# Patient Record
Sex: FEMALE | Race: WHITE | Employment: OTHER | URBAN - METROPOLITAN AREA
[De-identification: names, ages, dates, MRNs, and addresses within clinical notes are randomized per-mention and may not be internally consistent; named-entity substitution may affect disease eponyms.]

---

## 2022-10-25 ENCOUNTER — TELEPHONE (OUTPATIENT)
Dept: PULMONOLOGY | Age: 82
End: 2022-10-25

## 2022-10-25 NOTE — TELEPHONE ENCOUNTER
Patient did not show for NP  with Dr. Saul Camacho on 10/25/22. Reason:  na    This is patient's first no show. Patient was ano show on: na.      Patient did not reschedule.   Reschedule date:  na

## 2023-02-07 ENCOUNTER — OFFICE VISIT (OUTPATIENT)
Dept: PULMONOLOGY | Age: 83
End: 2023-02-07
Payer: MEDICARE

## 2023-02-07 VITALS
BODY MASS INDEX: 37.86 KG/M2 | HEART RATE: 76 BPM | SYSTOLIC BLOOD PRESSURE: 116 MMHG | DIASTOLIC BLOOD PRESSURE: 56 MMHG | RESPIRATION RATE: 16 BRPM | TEMPERATURE: 98 F | OXYGEN SATURATION: 96 % | HEIGHT: 62 IN

## 2023-02-07 DIAGNOSIS — E66.09 CLASS 2 OBESITY DUE TO EXCESS CALORIES WITH BODY MASS INDEX (BMI) OF 37.0 TO 37.9 IN ADULT, UNSPECIFIED WHETHER SERIOUS COMORBIDITY PRESENT: ICD-10-CM

## 2023-02-07 DIAGNOSIS — J96.11 CHRONIC RESPIRATORY FAILURE WITH HYPOXIA (HCC): ICD-10-CM

## 2023-02-07 DIAGNOSIS — Z01.811 ENCOUNTER FOR PRE-OPERATIVE RESPIRATORY CLEARANCE: ICD-10-CM

## 2023-02-07 PROCEDURE — 99202 OFFICE O/P NEW SF 15 MIN: CPT | Performed by: INTERNAL MEDICINE

## 2023-02-07 PROCEDURE — G8427 DOCREV CUR MEDS BY ELIG CLIN: HCPCS | Performed by: INTERNAL MEDICINE

## 2023-02-07 PROCEDURE — G8419 CALC BMI OUT NRM PARAM NOF/U: HCPCS | Performed by: INTERNAL MEDICINE

## 2023-02-07 PROCEDURE — 1036F TOBACCO NON-USER: CPT | Performed by: INTERNAL MEDICINE

## 2023-02-07 PROCEDURE — 1123F ACP DISCUSS/DSCN MKR DOCD: CPT | Performed by: INTERNAL MEDICINE

## 2023-02-07 PROCEDURE — 1090F PRES/ABSN URINE INCON ASSESS: CPT | Performed by: INTERNAL MEDICINE

## 2023-02-07 PROCEDURE — G8484 FLU IMMUNIZE NO ADMIN: HCPCS | Performed by: INTERNAL MEDICINE

## 2023-02-07 PROCEDURE — G8400 PT W/DXA NO RESULTS DOC: HCPCS | Performed by: INTERNAL MEDICINE

## 2023-02-07 ASSESSMENT — SLEEP AND FATIGUE QUESTIONNAIRES
HOW LIKELY ARE YOU TO NOD OFF OR FALL ASLEEP IN A CAR, WHILE STOPPED FOR A FEW MINUTES IN TRAFFIC: 0
HOW LIKELY ARE YOU TO NOD OFF OR FALL ASLEEP WHILE SITTING AND TALKING TO SOMEONE: 1
HOW LIKELY ARE YOU TO NOD OFF OR FALL ASLEEP WHILE SITTING AND READING: 0
ESS TOTAL SCORE: 5
HOW LIKELY ARE YOU TO NOD OFF OR FALL ASLEEP WHEN YOU ARE A PASSENGER IN A CAR FOR AN HOUR WITHOUT A BREAK: 0
HOW LIKELY ARE YOU TO NOD OFF OR FALL ASLEEP WHILE WATCHING TV: 0
HOW LIKELY ARE YOU TO NOD OFF OR FALL ASLEEP WHILE SITTING QUIETLY AFTER LUNCH WITHOUT ALCOHOL: 1
HOW LIKELY ARE YOU TO NOD OFF OR FALL ASLEEP WHILE SITTING INACTIVE IN A PUBLIC PLACE: 1
HOW LIKELY ARE YOU TO NOD OFF OR FALL ASLEEP WHILE LYING DOWN TO REST IN THE AFTERNOON WHEN CIRCUMSTANCES PERMIT: 2

## 2023-02-07 NOTE — PATIENT INSTRUCTIONS
Remember to bring a list of pulmonary medications and any CPAP or BiPAP machines to your next appointment with the office. Please keep all of your future appointments scheduled by [address], [facility] office. Out of respect for other patients and providers, you may be asked to reschedule your appointment if you arrive later than your scheduled appointment time. Appointments cancelled less than 24hrs in advance will be considered a no show. Patients with three missed appointments within 1 year or four missed appointments within 2 years can be dismissed from the practice. Please be aware that our physicians are required to work in the Intensive Care Unit at [facility].  Your appointment may need to be rescheduled if they are designated to work during your appointment time. You may receive a survey regarding the care you received during your visit. Your input is valuable to us. We encourage you to complete and return your survey. We hope you will choose us in the future for your healthcare needs. Pt instructed of all future appointment dates & times, including radiology, labs, procedures & referrals. If procedures were scheduled preparation instructions provided. Instructions on future appointments with [facility] were given.

## 2023-02-07 NOTE — LETTER
[facility] Critical Care and Sleep  [address]  [address]  [place] [address]  Phone: [phone]  Fax: [phone]           ERICA PARSONS MD      February 8, 2023     Patient: Rosibel De La Rosa   MR Number: [id number]   YOB: 1940   Date of Visit: 2/7/2023       Dear Dr. Whitlock:    Thank you for referring Rosibel De La Rosa to me for evaluation/treatment. Below are the relevant portions of my assessment and plan of care.          If you have questions, please do not hesitate to call me. I look forward to following Rosibel along with you.    Sincerely,        ERICA PARSONS MD    CC providers:  Noah Whitlock MD  [address] [place] [id number]  Via Mail

## 2023-02-07 NOTE — LETTER
[facility] Pul[facility] Critical Care and Sleep  [address] [place] [address]  Phone: [phone]  Fax: [phone]    Olive Watkins MD    February 8, 2023     Aba Charlton MD  [address] [place] [id number]    Patient: Claudine Escobar   MR Number: [id number]   YOB: 1940   Date of Visit: 2/7/2023       Dear Aba Charlton: Thank you for referring Claudine Escobar to me for evaluation/treatment. Below are the relevant portions of my assessment and plan of care. If you have questions, please do not hesitate to call me. I look forward to following Frank Mayer along with you.     Sincerely,      Olive Watkins MD

## 2023-02-07 NOTE — LETTER
[facility] Pulmonary Critical Care and Sleep  [address] [address]  Phone: [phone]  Fax: [phone]    Kassie Wong MD    February 8, 2023     Thor Pires MD  [address] [place] [id number]    Patient: Wisam Christie   MR Number: [id number]   YOB: 1940   Date of Visit: 2/7/2023       Dear Thor Pires: Thank you for referring Wisam Christie to me for evaluation/treatment. Below are the relevant portions of my assessment and plan of care. If you have questions, please do not hesitate to call me. I look forward to following Min Michaud along with you.     Sincerely,      Kassie Wong MD

## 2023-02-08 PROBLEM — J96.11 CHRONIC RESPIRATORY FAILURE WITH HYPOXIA (HCC): Status: ACTIVE | Noted: 2023-02-08

## 2023-02-09 NOTE — PROGRESS NOTES
Chief Complaint/Referring Provider:  Patient is being seen at the request of Dr. Sonya Cheng for a consultation for preop clearance      Presenting HPI: Patient is a 79-year-old female who has been referred to the office for pulm evaluation for preop clearance for EGD  Patient states that she fell down on her trunk about few years back and ever since patient is in nursing home, patient states that she has been on oxygen in the nursing home ever since, patient does not know why she is on the oxygen, patient states that she does not have any significant cough or expectoration at this time, patient does not have any shortness of breath or wheezing, patient does not have any chest pain or palpitations, patient states that she cannot walk for last 2 years time because of her back issues including sciatica, patient does not know whether she snores or not, patient states that she feels tired sometimes in the daytime but does not give any discrete history of sleep fragmentation or sleepiness on the time, patient states that she does not have any chest pain or palpitations, patient does not have any abdominal symptoms of concern, patient does not give history of any bright red blood per rectum, patient states that she does not know about any procedures, patient sometimes has leg edema, patient also states that she probably has high blood sugars at times, patient states that she used to smoke but she quit many decades back, patient is not giving any other pertinent review of system of concern    Past Medical History:   Diagnosis Date    Cystocele 07/10/2008    Urinary incontinence     Vulvitis 07/10/2008       Past Surgical History:   Procedure Laterality Date    HYSTERECTOMY (CERVIX STATUS UNKNOWN)      KNEE SURGERY  2009       Allergies   Allergen Reactions    Asa [Aspirin] Other (See Comments)     bleeding    Darvocet [Propoxyphene N-Acetaminophen] Itching       Medication list was reviewed and updated as needed in Epic    Social History     Socioeconomic History    Marital status:      Spouse name: Not on file    Number of children: Not on file    Years of education: Not on file    Highest education level: Not on file   Occupational History    Not on file   Tobacco Use    Smoking status: Never    Smokeless tobacco: Not on file   Substance and Sexual Activity    Alcohol use: Not on file    Drug use: Not on file    Sexual activity: Not on file   Other Topics Concern    Not on file   Social History Narrative    Not on file     Social Determinants of Health     Financial Resource Strain: Not on file   Food Insecurity: Not on file   Transportation Needs: Not on file   Physical Activity: Not on file   Stress: Not on file   Social Connections: Not on file   Intimate Partner Violence: Not on file   Housing Stability: Not on file       History reviewed. No pertinent family history. Review of Systems same as above    Physical Exam:  Blood pressure (!) 116/56, pulse 76, temperature 98 °F (36.7 °C), temperature source Temporal, resp. rate 16, height 5' 2\" (1.575 m), SpO2 96 %.'  Constitutional:  No acute distress. HENT:  Oropharynx is clear and moist. No thyromegaly. Mallampati 3  Eyes:  Conjunctivae arenormal. Pupils equal, round, and reactive to light. No scleral icterus. Neck: . No tracheal deviation present. No obvious thyroid mass. Short and large neck  Cardiovascular:Normal rate, regular rhythm, normal heart sounds. No right ventricular heave. Some lower extremity edema. Pulmonary/Chest: No wheezes. No rales. Chest wall is not dull to percussion. Noaccessory muscle usage or stridor. Decreased breath sound intensity at the bases  Abdominal: Soft. Bowel sounds present. No distension or hernia. Notenderness. Musculoskeletal: No cyanosis. No clubbing. No obvious joint deformity. Lymphadenopathy: No cervical or supraclavicular adenopathy. Skin: Skin is warm and dry.  No rash or nodules on the exposed extremities. Psychiatric: Normal mood and affect. Behavior is normal.  No anxiety. Neurologic: Alert, awake and oriented. PERRL. Speech fluent      Sleep Medicine Data:  Sitting and reading: Would never doze  Watching TV: Would never doze  Sitting, inactive in a public place (e.g. a theatre or a meeting): Slight chance of dozing  As a passenger in a car for an hour without a break: Would never doze  Lying down to rest in the afternoon when circumstances permit: Moderate chance of dozing  Sitting and talking to someone: Slight chance of dozing  Sitting quietly after a lunch without alcohol: Slight chance of dozing  In a car, while stopped for a few minutes in traffic: Would never doze  [place] Sleepiness Score: 5       Data:     Imaging:  I have reviewed radiology images personally. No orders to display     No results found. Assessment:    1. Encounter for pre-operative respiratory clearance      2. Class 2 obesity due to excess calories with body mass index (BMI) of 37.0 to 37.9 in adult, unspecified whether serious comorbidity present      3.  Chronic respiratory failure with hypoxia (HCC)          Plan:   Patient's review of system were discussed  Patient was told about the clinical findings including auscultation and implications  Patient does not have any adventitious sounds on auscultation at this time  No x-ray chest or PFT available for review-no clinical data to suggest that patient needs to have them at this time  Patient does have some class II obesity but does not give any discrete history of sleep fragmentation-will hold off on any sleep studies  Patient does have chronic respite failure with hypoxemia and patient does not know the reason for that  Please titrate oxygen to keep saturation between 90 to 94% only  Patient does not need any antibiotics steroids or inhalers from pulm standpointofview  Patient does not have any gross contraindication for upper GI endoscopy from pulmonary standpoint of view  Patient does have morbid obesity and there is a possibility of patient having hypoventilation and hypercarbia with anesthesia and patient needs to monitor for any increased somnolence after the procedure  Diuretics as given by patient's PCP

## 2023-02-10 NOTE — PROGRESS NOTES
MA Communication:   The following orders are received by verbal communication from Ryan Thomas MD    Orders include:    F/U PRN    Clearance paper faxed

## 2023-03-09 PROBLEM — Z01.811 ENCOUNTER FOR PRE-OPERATIVE RESPIRATORY CLEARANCE: Status: RESOLVED | Noted: 2023-02-07 | Resolved: 2023-03-09

## 2023-03-13 ENCOUNTER — ANESTHESIA (OUTPATIENT)
Dept: ENDOSCOPY | Age: 83
End: 2023-03-13
Payer: MEDICARE

## 2023-03-13 ENCOUNTER — ANESTHESIA EVENT (OUTPATIENT)
Dept: ENDOSCOPY | Age: 83
End: 2023-03-13
Payer: MEDICARE

## 2023-03-13 ENCOUNTER — HOSPITAL ENCOUNTER (OUTPATIENT)
Age: 83
Setting detail: OUTPATIENT SURGERY
Discharge: HOME | End: 2023-03-13
Attending: INTERNAL MEDICINE | Admitting: INTERNAL MEDICINE
Payer: MEDICARE

## 2023-03-13 VITALS
OXYGEN SATURATION: 100 % | HEIGHT: 62 IN | BODY MASS INDEX: 37.86 KG/M2 | DIASTOLIC BLOOD PRESSURE: 82 MMHG | RESPIRATION RATE: 16 BRPM | SYSTOLIC BLOOD PRESSURE: 112 MMHG | HEART RATE: 79 BPM | TEMPERATURE: 97 F

## 2023-03-13 DIAGNOSIS — R13.10 DYSPHAGIA, UNSPECIFIED TYPE: ICD-10-CM

## 2023-03-13 DIAGNOSIS — Z87.11 HX OF GASTRIC ULCER: ICD-10-CM

## 2023-03-13 DIAGNOSIS — D64.9 ANEMIA, UNSPECIFIED TYPE: ICD-10-CM

## 2023-03-13 DIAGNOSIS — K21.9 GASTROESOPHAGEAL REFLUX DISEASE, UNSPECIFIED WHETHER ESOPHAGITIS PRESENT: ICD-10-CM

## 2023-03-13 LAB
EKG ATRIAL RATE: 108 BPM
EKG DIAGNOSIS: NORMAL
EKG Q-T INTERVAL: 350 MS
EKG QRS DURATION: 86 MS
EKG QTC CALCULATION (BAZETT): 476 MS
EKG R AXIS: -64 DEGREES
EKG T AXIS: 81 DEGREES
EKG VENTRICULAR RATE: 111 BPM
GLUCOSE BLD-MCNC: 108 MG/DL (ref 70–99)
GLUCOSE BLD-MCNC: 89 MG/DL (ref 70–99)
PERFORMED ON: ABNORMAL
PERFORMED ON: NORMAL

## 2023-03-13 PROCEDURE — 88341 IMHCHEM/IMCYTCHM EA ADD ANTB: CPT

## 2023-03-13 PROCEDURE — 3700000000 HC ANESTHESIA ATTENDED CARE: Performed by: INTERNAL MEDICINE

## 2023-03-13 PROCEDURE — 88342 IMHCHEM/IMCYTCHM 1ST ANTB: CPT

## 2023-03-13 PROCEDURE — 7100000011 HC PHASE II RECOVERY - ADDTL 15 MIN: Performed by: INTERNAL MEDICINE

## 2023-03-13 PROCEDURE — 93005 ELECTROCARDIOGRAM TRACING: CPT | Performed by: ANESTHESIOLOGY

## 2023-03-13 PROCEDURE — 88312 SPECIAL STAINS GROUP 1: CPT

## 2023-03-13 PROCEDURE — 2500000003 HC RX 250 WO HCPCS: Performed by: NURSE ANESTHETIST, CERTIFIED REGISTERED

## 2023-03-13 PROCEDURE — 6360000002 HC RX W HCPCS: Performed by: NURSE ANESTHETIST, CERTIFIED REGISTERED

## 2023-03-13 PROCEDURE — 2580000003 HC RX 258: Performed by: FAMILY MEDICINE

## 2023-03-13 PROCEDURE — 2709999900 HC NON-CHARGEABLE SUPPLY: Performed by: INTERNAL MEDICINE

## 2023-03-13 PROCEDURE — 7100000010 HC PHASE II RECOVERY - FIRST 15 MIN: Performed by: INTERNAL MEDICINE

## 2023-03-13 PROCEDURE — 3609012400 HC EGD TRANSORAL BIOPSY SINGLE/MULTIPLE: Performed by: INTERNAL MEDICINE

## 2023-03-13 PROCEDURE — 93010 ELECTROCARDIOGRAM REPORT: CPT | Performed by: INTERNAL MEDICINE

## 2023-03-13 PROCEDURE — 88305 TISSUE EXAM BY PATHOLOGIST: CPT

## 2023-03-13 RX ORDER — OXYCODONE HYDROCHLORIDE AND ACETAMINOPHEN 5; 325 MG/1; MG/1
1 TABLET ORAL EVERY 6 HOURS PRN
COMMUNITY

## 2023-03-13 RX ORDER — ZINC SULFATE 50(220)MG
50 CAPSULE ORAL DAILY
COMMUNITY

## 2023-03-13 RX ORDER — CYCLOBENZAPRINE HCL 5 MG
5 TABLET ORAL EVERY 12 HOURS PRN
COMMUNITY

## 2023-03-13 RX ORDER — OMEPRAZOLE 20 MG/1
20 CAPSULE, DELAYED RELEASE ORAL DAILY
COMMUNITY

## 2023-03-13 RX ORDER — SENNA PLUS 8.6 MG/1
1 TABLET ORAL NIGHTLY
COMMUNITY

## 2023-03-13 RX ORDER — VITAMIN B COMPLEX
1 CAPSULE ORAL DAILY
COMMUNITY

## 2023-03-13 RX ORDER — NYSTATIN 100000 [USP'U]/G
POWDER TOPICAL PRN
COMMUNITY

## 2023-03-13 RX ORDER — SERTRALINE HYDROCHLORIDE 25 MG/1
25 TABLET, FILM COATED ORAL DAILY
COMMUNITY
Start: 2023-02-17

## 2023-03-13 RX ORDER — METHYLPREDNISOLONE 4 MG
TABLET, DOSE PACK ORAL DAILY
COMMUNITY

## 2023-03-13 RX ORDER — LEVOTHYROXINE SODIUM 0.07 MG/1
75 TABLET ORAL DAILY
COMMUNITY
Start: 2023-03-07

## 2023-03-13 RX ORDER — OXYCODONE HCL 10 MG/1
10 TABLET, FILM COATED, EXTENDED RELEASE ORAL EVERY 12 HOURS
COMMUNITY

## 2023-03-13 RX ORDER — LEVETIRACETAM 500 MG/1
500 TABLET ORAL 2 TIMES DAILY
COMMUNITY
Start: 2019-06-12

## 2023-03-13 RX ORDER — FERROUS SULFATE 325(65) MG
325 TABLET ORAL
COMMUNITY

## 2023-03-13 RX ORDER — CHOLECALCIFEROL (VITAMIN D3) 1250 MCG
CAPSULE ORAL
COMMUNITY

## 2023-03-13 RX ORDER — MEMANTINE HYDROCHLORIDE 5 MG/1
5 TABLET ORAL 2 TIMES DAILY
COMMUNITY

## 2023-03-13 RX ORDER — ASCORBIC ACID 500 MG
500 TABLET ORAL 2 TIMES DAILY
COMMUNITY

## 2023-03-13 RX ORDER — SODIUM CHLORIDE, SODIUM LACTATE, POTASSIUM CHLORIDE, CALCIUM CHLORIDE 600; 310; 30; 20 MG/100ML; MG/100ML; MG/100ML; MG/100ML
INJECTION, SOLUTION INTRAVENOUS CONTINUOUS
Status: DISCONTINUED | OUTPATIENT
Start: 2023-03-13 | End: 2023-03-13 | Stop reason: HOSPADM

## 2023-03-13 RX ORDER — CARVEDILOL 12.5 MG/1
12.5 TABLET ORAL 2 TIMES DAILY WITH MEALS
COMMUNITY

## 2023-03-13 RX ORDER — INSULIN LISPRO 100 [IU]/ML
15 INJECTION, SOLUTION INTRAVENOUS; SUBCUTANEOUS
COMMUNITY
Start: 2023-03-07

## 2023-03-13 RX ORDER — LIDOCAINE HYDROCHLORIDE 20 MG/ML
INJECTION, SOLUTION EPIDURAL; INFILTRATION; INTRACAUDAL; PERINEURAL PRN
Status: DISCONTINUED | OUTPATIENT
Start: 2023-03-13 | End: 2023-03-13 | Stop reason: SDUPTHER

## 2023-03-13 RX ORDER — GABAPENTIN 100 MG/1
100 CAPSULE ORAL DAILY
COMMUNITY
Start: 2023-02-28

## 2023-03-13 RX ORDER — POTASSIUM CHLORIDE 750 MG/1
10 TABLET, EXTENDED RELEASE ORAL 2 TIMES DAILY
COMMUNITY
Start: 2023-03-06

## 2023-03-13 RX ORDER — NITROFURANTOIN 25; 75 MG/1; MG/1
100 CAPSULE ORAL DAILY
COMMUNITY

## 2023-03-13 RX ORDER — PROPOFOL 10 MG/ML
INJECTION, EMULSION INTRAVENOUS PRN
Status: DISCONTINUED | OUTPATIENT
Start: 2023-03-13 | End: 2023-03-13 | Stop reason: SDUPTHER

## 2023-03-13 RX ORDER — MAGNESIUM OXIDE 400 MG/1
400 TABLET ORAL DAILY
COMMUNITY

## 2023-03-13 RX ORDER — TAMSULOSIN HYDROCHLORIDE 0.4 MG/1
0.4 CAPSULE ORAL DAILY
COMMUNITY

## 2023-03-13 RX ORDER — INSULIN GLARGINE 100 [IU]/ML
10 INJECTION, SOLUTION SUBCUTANEOUS NIGHTLY
COMMUNITY

## 2023-03-13 RX ORDER — LANOLIN ALCOHOL/MO/W.PET/CERES
3 CREAM (GRAM) TOPICAL DAILY
COMMUNITY

## 2023-03-13 RX ORDER — DULAGLUTIDE 0.75 MG/.5ML
0.75 INJECTION, SOLUTION SUBCUTANEOUS WEEKLY
COMMUNITY

## 2023-03-13 RX ORDER — GABAPENTIN 300 MG/1
300 CAPSULE ORAL NIGHTLY
COMMUNITY
Start: 2023-02-13

## 2023-03-13 RX ORDER — FUROSEMIDE 40 MG/1
40 TABLET ORAL DAILY
COMMUNITY

## 2023-03-13 RX ADMIN — SODIUM CHLORIDE, POTASSIUM CHLORIDE, SODIUM LACTATE AND CALCIUM CHLORIDE: 600; 310; 30; 20 INJECTION, SOLUTION INTRAVENOUS at 13:11

## 2023-03-13 RX ADMIN — PROPOFOL 30 MG: 10 INJECTION, EMULSION INTRAVENOUS at 13:21

## 2023-03-13 RX ADMIN — PROPOFOL 20 MG: 10 INJECTION, EMULSION INTRAVENOUS at 13:23

## 2023-03-13 RX ADMIN — LIDOCAINE HYDROCHLORIDE 60 MG: 20 INJECTION, SOLUTION EPIDURAL; INFILTRATION; INTRACAUDAL; PERINEURAL at 13:21

## 2023-03-13 RX ADMIN — PROPOFOL 20 MG: 10 INJECTION, EMULSION INTRAVENOUS at 13:22

## 2023-03-13 ASSESSMENT — PAIN DESCRIPTION - FREQUENCY: FREQUENCY: CONTINUOUS

## 2023-03-13 ASSESSMENT — PAIN DESCRIPTION - ONSET: ONSET: ON-GOING

## 2023-03-13 ASSESSMENT — PAIN DESCRIPTION - DESCRIPTORS
DESCRIPTORS: PATIENT UNABLE TO DESCRIBE
DESCRIPTORS: ACHING
DESCRIPTORS: PATIENT UNABLE TO DESCRIBE

## 2023-03-13 ASSESSMENT — PAIN SCALES - WONG BAKER
WONGBAKER_NUMERICALRESPONSE: 0
WONGBAKER_NUMERICALRESPONSE: 2
WONGBAKER_NUMERICALRESPONSE: 0
WONGBAKER_NUMERICALRESPONSE: 4

## 2023-03-13 ASSESSMENT — PAIN DESCRIPTION - DIRECTION
RADIATING_TOWARDS: BILATERAL LEGS
RADIATING_TOWARDS: LEGS

## 2023-03-13 ASSESSMENT — PAIN DESCRIPTION - PAIN TYPE
TYPE: CHRONIC PAIN
TYPE: CHRONIC PAIN

## 2023-03-13 ASSESSMENT — PAIN DESCRIPTION - ORIENTATION
ORIENTATION: LOWER
ORIENTATION: LOWER

## 2023-03-13 ASSESSMENT — PAIN DESCRIPTION - LOCATION
LOCATION: BACK
LOCATION: BACK

## 2023-03-13 ASSESSMENT — ENCOUNTER SYMPTOMS: SHORTNESS OF BREATH: 1

## 2023-03-13 ASSESSMENT — PAIN - FUNCTIONAL ASSESSMENT: PAIN_FUNCTIONAL_ASSESSMENT: 0-10

## 2023-03-13 ASSESSMENT — COPD QUESTIONNAIRES: CAT_SEVERITY: SEVERE

## 2023-03-13 NOTE — DISCHARGE INSTRUCTIONS
ENDOSCOPY DISCHARGE INSTRUCTIONS:    Call the physician that did your procedure for any questions or concerns:           DR. Clair Chapman: [phone]               ACTIVITY:    There are potential side effects from the medications used for sedation and anesthesia during your procedure. These include:  Dizziness or light-headedness, confusion or memory loss, delayed reaction times, loss of coordination, nausea and vomiting. Because of your increased risk for injury, we ask that you observe the following precautions: For the next 24 hours,  DO NOT operate an automobile, bicycle, motorcycle, , power tools or large equipment of any kind. Do not drink alcohol, sign any legal documents or make any legal decisions for 24 hours. Do not bend your head over lower than your heart. DO sit on the side of bed/couch awhile before getting up. Plan on bedrest or quiet relaxation today. You may resume normal activities in 24 hours. DIET:    Your first meal today should be light, avoiding spicy and fatty foods. If you tolerate this first meal, then you may advance to your regular diet unless otherwise advised by your physician. NORMAL SYMPTOMS:  -Mild sore throat if youve had an EGD   -Gaseous discomfort if you've had an EGD or Colonoscopy. NOTIFY YOUR PHYSICIAN IF THESE SYMPTOMS OCCUR:  1. Fever (greater than 100)  5. Increased abdominal bloating  2. Severe pain    6. Excessive bleeding  3. Nausea and vomiting  7. Chest pain                                                                    4. Chills    8. Shortness of breath      ADDITIONAL INSTRUCTIONS:    Biopsy results: Call office in one week for results if you have not heard from physician. Educational Information:Antral Biopsy to rule out Gastritis   Esophageal Biopsy to rule out Candida     Follow up:       Please review these discharge instructions this evening or tomorrow for  information you may have forgotten.             We want to thank you for choosing the [facility] as your health care provider. We always strive to provide you with excellent care while you are here. You may receive a survey in the mail regarding your care. We would appreciate you taking a few minutes of your time to complete this survey. Again, thank you for choosing the [facility]      Gastritis: Care Instructions  Your Care Instructions     Gastritis is a sore and upset stomach. It happens when something irritates the stomach lining. Many things can cause it. These include an infection such as the flu or something you ate or drank. Medicines or a sore on the lining of the stomach (ulcer) also can cause it. Your belly may bloat and ache. You may belch, vomit, and feel sick to your stomach. You should be able to relieve the problem by taking medicine. And it may help to change your diet. If gastritis lasts, your doctor may prescribe medicine. Follow-up care is a key part of your treatment and safety. Be sure to make and go to all appointments, and call your doctor if you are having problems. It's also a good idea to know your test results and keep a list of the medicines you take. How can you care for yourself at home? If your doctor prescribed antibiotics, take them as directed. Do not stop taking them just because you feel better. You need to take the full course of antibiotics. Be safe with medicines. If your doctor prescribed medicine to decrease stomach acid, take it as directed. Call your doctor if you think you are having a problem with your medicine. Do not take any other medicine, including over-the-counter pain relievers, without talking to your doctor first.  If your doctor recommends over-the-counter medicine to reduce stomach acid, such as Pepcid AC (famotidine), Prilosec (omeprazole), or Tagamet HB (cimetidine) follow the directions on the label. Drink plenty of fluids to prevent dehydration. Choose water and other clear liquids.  If you have kidney, heart, or liver disease and have to limit fluids, talk with your doctor before you increase the amount of fluids you drink. Avoid foods that make your symptoms worse. These may include chocolate, mint, alcohol, pepper, spicy foods, high-fat foods, or drinks with caffeine in them, such as tea, coffee, kervin, or energy drinks. If your symptoms are worse after you eat a certain food, you may want to stop eating it to see if your symptoms get better. When should you call for help? Call 911 anytime you think you may need emergency care. For example, call if:    You vomit blood or what looks like coffee grounds. You pass maroon or very bloody stools. Call your doctor now or seek immediate medical care if:    You start breathing fast and have not produced urine in the last 8 hours. You cannot keep fluids down. Watch closely for changes in your health, and be sure to contact your doctor if:    You do not get better as expected. Learning About Candida Auris Infections  What is a Candida auris infection? Candida auris (also called C. auris) is a fungus in the yeast family that can cause an infection in blood, tissues, or wounds. The infection can be serious and even fatal. It's hard to treat because commonly used antifungal medicines often don't work. Nilda Inks is most often found in patients in the intensive care unit (ICU) of hospitals. It mostly affects people who already have one or more serious illnesses or a weakened immune system or who have frequent stays in a hospital, ICU, or nursing home. Some people carry C. auris on their skin, but they don't get sick. But they can pass the fungus to others who are more likely to get a serious infection. Nilda Inks spreads from one person to another through touch. It can also be spread when you touch something that has the fungus on it. What are the symptoms?   Symptoms of a Candida auris infection depend on whether it occurs in the blood, the ear, a wound, or some other part of the body. The most common symptoms are a fever and chills that don't get better when treated with medicines that fight infections. Since most people with a C. auris infection are already sick, it may be hard to tell which symptoms are caused by Mireya Mcneal. How is it diagnosed? If a Candida auris infection is suspected, your doctor may take a sample of blood, urine, a wound, or other body fluid such as cerebral spinal fluid (CSF). The sample is sent to the lab for testing. A person may not be sick but may carry C. auris on their skin. The doctor may wipe the skin with a swab and send the swab to the lab. How is it treated? A Candida auris infection is treated with antifungal medicines. Your doctor will use test results to see which medicine will work best. C. auris infections often don't respond well to just one medicine. In that case, your doctor may give you high doses of more than one antifungal.  If you have C. auris on your skin but aren't sick, you probably won't get treated. But if you are in a hospital or a nursing home, the staff may take steps to keep you from spreading C. auris to others. How can you prevent the spread of the infection to others? You, your doctor, the hospital staff, and your visitors all play a part in keeping the infection from spreading to others. Isolation. Your doctor may want to keep you away from other people in the hospital. You may be in a special hospital room, called an isolation room. Visitors may be limited to prevent the C. auris fungus from being carried outside your room. Hand-washing. Everyone who comes in the room must wash their hands with soap and water or use an alcohol-based hand  both before and after the visit. This helps stop the fungus from spreading. When you wash your hands:  Use running water and soap. Rub your hands together for at least 20 seconds.   Pay special attention to your wrists, the backs of your hands, between your fingers, and under your fingernails. Don't touch the faucet with your hand. Use a paper towel to turn off the water. Gloves and gown. Visitors and caregivers may have to use disposable gloves and a gown over their clothes. This helps prevent the fungus from spreading.

## 2023-03-13 NOTE — ANESTHESIA POSTPROCEDURE EVALUATION
Department of Anesthesiology  Postprocedure Note    Patient: Jennifer Parks  MRN: [id number]  [place]: 1940  Date of evaluation: 3/13/2023      Procedure Summary     Date: 03/13/23 Room / Location: [facility]    Anesthesia Start: 4428 Anesthesia Stop: 1332    Procedure: EGD BIOPSY Diagnosis:       Gastroesophageal reflux disease, unspecified whether esophagitis present      Dysphagia, unspecified type      Hx of gastric ulcer      Anemia, unspecified type      (Gastroesophageal reflux disease, unspecified whether esophagitis present [K21.9])      (Dysphagia, unspecified type [R13.10])      (Hx of gastric ulcer [Z87.11])      (Anemia, unspecified type [D64.9])    Surgeons: Raquel Duong MD Responsible Provider: Hector Breen MD    Anesthesia Type: MAC ASA Status: 3          Anesthesia Type: No value filed.     Hector Phase I: Hector Score: 9    Hector Phase II:        Anesthesia Post Evaluation    Patient location during evaluation: PACU  Level of consciousness: awake  Complications: no  Multimodal analgesia pain management approach

## 2023-03-13 NOTE — H&P
History and Physical / Pre-Sedation Assessment    Patient:  Nacho Cadena   :   1940     Intended Procedure:  EGD     HPI: GERD, Dysphagia    Nurses notes reviewed and agreed. Medications reviewed  Allergies: Allergies   Allergen Reactions    Asa [Aspirin] Other (See Comments)     bleeding    Darvocet [Propoxyphene N-Acetaminophen] Itching    Ketorolac Tromethamine     Talwin [Pentazocine] Rash     bleeding  bleeding         Physical Exam:  Vital Signs: BP (!) 108/59   Pulse 63   Temp 97.1 °F (36.2 °C) (Temporal)   Resp 18   Ht 5' 2\" (1.575 m)   SpO2 97%   BMI 37.86 kg/m²    Airway: Mallampati: I (soft palate, uvula, fauces, tonsillar pillars visible)  Pulmonary:Normal  Cardiac:Normal  Abdomen:Abnormal  Hyst. Scar    Pre-Procedure Assessment / Plan:  ASA: Class 3 - A patient with severe systemic disease that limits activity but is not incapacitating  Level of Sedation Plan: per anesthesia  Post Procedure plan: Return to same level of care    I assessed the patient and find that the patient is in satisfactory condition to proceed with the planned procedure and sedation plan. I have explained the risk, benefits, and alternatives to the procedure; the patient and the patient's  and daughter understands and agrees to proceed.        Mando Muir MD  3/13/2023

## 2023-03-13 NOTE — ANESTHESIA PRE PROCEDURE
Department of Anesthesiology  Preprocedure Note       Name:  Bassam Correa   Age:  79 y.o.  :  1940                                          MRN:  [id number]         Date:  3/13/2023      Surgeon: Smith Quiros):  Jake Villegas MD    Procedure: Procedure(s):  ESOPHAGOGASTRODUODENOSCOPY WITH POSSIBLE DILATION    Medications prior to admission:   Prior to Admission medications    Medication Sig Start Date End Date Taking? Authorizing Provider   clotrimazole-betamethasone (LOTRISONE) 1-0.05 % cream Apply  topically 2 times daily. 4/7/15   Julia Fraire DO   clonazePAM Lucina Babinski) 1 MG tablet  14   Historical Provider, MD   digoxin (LANOXIN) 125 MCG tablet  14   Historical Provider, MD   furosemide (LASIX) 80 MG tablet  14   Historical Provider, MD   ibuprofen (ADVIL;MOTRIN) 800 MG tablet  14   Historical Provider, MD   polyethylene glycol (GLYCOLAX) powder  14   Historical Provider, MD   KLOR-CON M20 20 MEQ tablet  14   Historical Provider, MD   escitalopram (LEXAPRO) 20 MG tablet Take 20 mg by mouth daily. Historical Provider, MD   sumatriptan (IMITREX) 100 MG tablet Take 100 mg by mouth once as needed. Historical Provider, MD   gabapentin (NEURONTIN) 800 MG tablet Take 800 mg by mouth 3 times daily. Historical Provider, MD   donepezil (ARICEPT) 10 MG tablet Take 10 mg by mouth nightly. Historical Provider, MD   clotrimazole-betamethasone (LOTRISONE) cream Apply  topically. Use as directed 3/2/10   Deborah Anguiano DO       Current medications:    No current facility-administered medications for this encounter. 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    Cystocele [id number]    Hot flash, menopausal N95.1    Night sweats R61    Mastalgia in female N63.2    Skin yeast infection B37.2    Skin lesions L98.9    Class 2 obesity due to excess calories with body mass index (BMI) of 37.0 to 37.9 in adult E66.09, Z68.37    Chronic respiratory failure with hypoxia (HCC) J96.11       Past Medical History:        Diagnosis Date    Cystocele 07/10/2008    Urinary incontinence     Vulvitis 07/10/2008       Past Surgical History:        Procedure Laterality Date    HYSTERECTOMY (CERVIX STATUS UNKNOWN)      KNEE SURGERY  2009       Social History:    Social History     Tobacco Use    Smoking status: Never    Smokeless tobacco: Not on file   Substance Use Topics    Alcohol use: Not on file                                Counseling given: Not Answered      Vital Signs (Current):   Vitals:    03/13/23 1252   BP: (!) 108/59   Pulse: 63   Resp: 18   Temp: 97.1 °F (36.2 °C)   TempSrc: Temporal   SpO2: 97%   Height: 5' 2\" (1.575 m)                                              BP Readings from Last 3 Encounters:   03/13/23 (!) 108/59   02/07/23 (!) 116/56   04/15/15 126/82       NPO Status: Time of last liquid consumption: 2230                        Time of last solid consumption: 2230                        Date of last liquid consumption: 03/12/23                        Date of last solid food consumption: 03/12/23    BMI:   Wt Readings from Last 3 Encounters:   04/15/15 207 lb (93.9 kg)   04/07/15 207 lb (93.9 kg)   08/18/14 202 lb (91.6 kg)     Body mass index is 37.86 kg/m².     CBC: No results found for: WBC, RBC, HGB, HCT, MCV, RDW, PLT    CMP: No results found for: NA, K, CL, CO2, BUN, CREATININE, GFRAA, AGRATIO, LABGLOM, GLUCOSE, GLU, PROT, CALCIUM, BILITOT, ALKPHOS, AST, ALT    POC Tests: No results for input(s): POCGLU, POCNA, POCK, POCCL, POCBUN, POCHEMO, POCHCT in the last 72 hours.    Coags: No results found for: PROTIME, INR, APTT    HCG (If Applicable): No results found for: PREGTESTUR, PREGSERUM, HCG, HCGQUANT     ABGs: No results found for: PHART, PO2ART, AFK7VEE, UNZ5QSF, BEART, S9ABFKTF     Type & Screen (If Applicable):  No results found for: LABABO, LABRH    Drug/Infectious Status (If Applicable):  No results found for: HIV, HEPCAB    COVID-19 Screening (If Applicable): No results found for: COVID19        Anesthesia Evaluation    Airway: Mallampati: IV  TM distance: >3 FB     Mouth opening: > = 3 FB   Dental:    (+) edentulous      Pulmonary:   (+) COPD: severe,  shortness of breath:                             Cardiovascular:  Exercise tolerance: poor (<4 METS),   (+) hypertension:, dysrhythmias: atrial fibrillation,     (-) past MI                Neuro/Psych:   (+) seizures:, dementia   (-) CVA           GI/Hepatic/Renal:   (+) GERD:,           Endo/Other:        (-) diabetes mellitus               Abdominal:             Vascular: Other Findings:           Anesthesia Plan      MAC     ASA 3             Anesthetic plan and risks discussed with patient. Plan discussed with CRNA.              -had covid 2021 and since then has stayed at a nursing home; on oxygen at the nursing home.   Does not ambulate, family is unclear why  -dementia      Anthony Miller MD   3/13/2023

## 2023-03-13 NOTE — PROGRESS NOTES
Ambulatory Surgery/Procedure Discharge Note    Vitals:    03/13/23 1430   BP: 112/82   Pulse: 79   Resp: 16   Temp:    SpO2: 100%       No intake/output data recorded. Restroom use offered before discharge. Yes    Pain assessment:  level of pain (1-10, 10 severe)  Pain Level: 4  Pt to Endoscopy recovery post EGD. Pt HR noted to fluctuate from 60's to to 140's. Pt awake at intervals, able to take sips of water. Dr. Erica Shipman notified with new orders,order 12 lead EKG. Pt c/o chronic  back pain but is unable to describe. Pt tolerating PO fluids well. Per anesthesia pt ok for discharge. Discharge instructions given to pt's daughter and she states understanding of these instructions. Report called to Luis Andujar, nurse at [facility]. Patient discharged to home/self care.  Patient discharged via wheel chair by transporter to waiting family/S.O.       3/13/2023 3:08 PM .

## 2023-03-13 NOTE — BRIEF OP NOTE
Brief Postoperative Note      Patient: Ana Leiva  YOB: 1940  MRN: [id number]    Date of Procedure: 3/13/2023    Pre-Op Diagnosis: Gastroesophageal reflux disease, unspecified whether esophagitis present [K21.9]  Dysphagia, unspecified type [R13.10]  Hx of gastric ulcer [Z87.11]  Anemia, unspecified type [D64.9]    Post-Op Diagnosis: Same       Procedure(s):  ESOPHAGOGASTRODUODENOSCOPY WITH POSSIBLE DILATION    Surgeon(s):  Bishop Guerrero MD    Assistant:  * No surgical staff found *    Anesthesia: Monitor Anesthesia Care    Estimated Blood Loss (mL): Minimal    Complications: None    Specimens:   ID Type Source Tests Collected by Time Destination   A : Antral bx r/o gastritis Gastric Gastric SURGICAL PATHOLOGY Bishop Guerrero MD 3/13/2023 1323    B : Mid Esophageal bx r/o candida Tissue Esophagus SURGICAL PATHOLOGY Bishop Guerrero MD 3/13/2023 1324        Implants:  * No implants in log *      Drains: * No LDAs found *    Findings: Gastritis    Electronically signed by Bishop Guerrero MD on 3/13/2023 at 1:26 PM

## 2023-03-15 NOTE — PROCEDURES
[address]               [address]                               COLONOSCOPY REPORT    PATIENT NAME: Marlene Rodriguez                  :        1940  MED REC NO:   [id number]                          ROOM:  ACCOUNT NO:   [de-identified]                           ADMIT DATE: 2023  PROVIDER:     Beot Mary MD    DATE OF PROCEDURE:  2023    ENDOSCOPY REPORT    She was an outpatient. The patient is a nursing home resident. PROCEDURE PERFORMED:  EGD with biopsy x2 and pictures. SURGEON:  Beto Mary MD    INDICATION FOR PROCEDURE:  This is an 79-year-old nursing home resident  who was seen in the GI Clinic in [place] recently for evaluation of  abdominal pain and dysphagia. She had similar symptoms in the past and  was scheduled multiple times to be done but never showed up. CRNA  evaluating her case declined to sedate her in [place] so I suggested  we _____ her at [facility]    PROCEDURE NOTE:  Informed consent had been given by the patient's  daughter. After adequate sedation with the patient lying supine on her  back, an upper GI video endoscopy was carried out using Olympus scope  without difficulty. Her throat appeared normal.  There were a few small  whitish spots in the midesophagus. Biopsies were done. Candida  esophagitis is possible although picture is not classic. There is no  stricture, narrowing, hiatal hernia, or Schatski ring. Certainly, there  was no tumor, ulceration, or bleeding. The duodenum showed  mild-to-moderate diffuse gastritis basically in the antrum. Biopsies  were done. There is no peptic ulceration in this patient who has a past  history of peptic ulcer. Retroflex view of the fundus did not show any  significant change except mild gastritis. Pylorus was slightly  asymmetrical.  The duodenum was normal.  There was no bleeding.   The  patient tolerated the exam well.    IMPRESSION:  1.  Grossly normal throat.  No thrush.  2.  Few white spots in the midesophagus, need to rule out Candida  esophagitis, biopsies done.  3.  Otherwise, normal esophagus, in particular, no obstruction, no  dilatation needed.  4.  Mild-to-moderate diffuse gastritis.  Biopsies done.  5.  Normal duodenum.    PLAN:  Above was explained to the patient although she was a bit sleepy.  Unfortunately, her daughter could not be located, and I had left the  message for her to call me next day to discuss the results.  Findings  will be communicated to Dr. Saldivar.  From the list that was provided  to me, she does not appear to be on any stomach medications, so I would  suggest at least giving her a low dose proton pump inhibitor such as  Prilosec 20 mg daily.  This is especially necessary because she is on  ibuprofen chronically.  She will be seen for a followup in the GI Clinic  in [place] in two to three weeks' time,  and I will discuss all of  this in more detail with her family and with patient.        MING CARABALLO MD    D: 03/14/2023 9:28:10       T: 03/14/2023 12:00:29     [email or web address]  Job#: [id number]     Doc#: [id number]    CC:  MD Ming Olsen MD

## 2024-04-12 NOTE — PROCEDURES
[place] [address]                               COLONOSCOPY REPORT    PATIENT NAME: MARIELLA MUHAMMAD                  :        1940  MED REC NO:   [id number]                          ROOM:  ACCOUNT NO:   [id number]                           ADMIT DATE: 2023  PROVIDER:     Ming Isabel MD    DATE OF PROCEDURE:  2023    ADDENDUM    ESTIMATED BLOOD LOSS:  Zero.        MING ISABEL MD    D: 2023 9:30:19       T: 2023 19:00:41     [email or web address]  Job#: [id number]     Doc#: [id number]    CC:  Carlos Saldivar MD   [Normal Development] : Normal Development [None] : none [FreeTextEntry1] : no vision or hearing concerns [place] within normal for age.

## (undated) DEVICE — CANNULA SAMP CO2 AD GRN 7FT CO2 AND 7FT O2 TBNG UNIV CONN

## (undated) DEVICE — FORCEPS BX L240CM JAW DIA2.4MM ORNG L CAP W/ NDL DISP RAD